# Patient Record
(demographics unavailable — no encounter records)

---

## 2025-02-28 NOTE — PHYSICAL EXAM
[No Acute Distress] : no acute distress [No Respiratory Distress] : no respiratory distress  [Normal Rate] : normal rate  [Regular Rhythm] : with a regular rhythm [de-identified] : Left posterior wrist area with small superficial wound that is dry and clean.

## 2025-02-28 NOTE — ASSESSMENT
[FreeTextEntry1] : Hypertension-blood pressure is controlled.  Continue losartan /25 daily and amlodipine 10 mg daily.  Diabetes/qvigxbi-wpiyhg-bs A1c was sent as well. For now he is going to continue with Mounjaro 2.5 mg weekly. Discussed that he should weigh himself on a weekly basis and as long as he continues to lose weight he can remain on the present dose unless his A1c is elevated.  Anxiety-lorazepam 2 mg to use as needed was renewed.   chest, general

## 2025-02-28 NOTE — HISTORY OF PRESENT ILLNESS
[FreeTextEntry8] : Presents for follow-up and prescription renewal. History of diabetes, hypertension, obesity and mild anxiety. Also recently diagnosed with basal cell carcinoma on his left wrist area.  Has Moh's surgery scheduled for the near future.

## 2025-05-19 NOTE — ASSESSMENT
[FreeTextEntry1] : Probable tick bite/rash-initially the area sounded like it was more indurated related to the insect bite.  But he describes does not sound like it is typical for erythema migrans.  His exam at present shows only mild surrounding erythema. A Lyme titer was sent.

## 2025-05-19 NOTE — PHYSICAL EXAM
[de-identified] : Left upper outer buttock area with previous bite wound with slight surrounding erythema.  No induration.

## 2025-05-19 NOTE — HISTORY OF PRESENT ILLNESS
[FreeTextEntry8] : Presents after probable tick bite and subsequent rash that occurred approximately 1 week ago.  Initially the area was somewhat swollen and tender.  No other symptoms.